# Patient Record
Sex: FEMALE | URBAN - METROPOLITAN AREA
[De-identification: names, ages, dates, MRNs, and addresses within clinical notes are randomized per-mention and may not be internally consistent; named-entity substitution may affect disease eponyms.]

---

## 2021-01-29 ENCOUNTER — IMPORTED ENCOUNTER (OUTPATIENT)
Dept: URBAN - METROPOLITAN AREA CLINIC 1 | Facility: CLINIC | Age: 71
End: 2021-01-29

## 2021-01-29 PROBLEM — H35.3131: Noted: 2021-01-29

## 2021-01-29 PROBLEM — Z79.84: Noted: 2021-01-29

## 2021-01-29 PROBLEM — H25.813: Noted: 2021-01-29

## 2021-01-29 PROBLEM — E11.9: Noted: 2021-01-29

## 2021-01-29 PROCEDURE — 92004 COMPRE OPH EXAM NEW PT 1/>: CPT

## 2021-01-29 PROCEDURE — 92134 CPTRZ OPH DX IMG PST SGM RTA: CPT

## 2021-01-29 NOTE — PATIENT DISCUSSION
1.  DM Type II (Oral Meds) without sign of diabetic retinopathy and no blot heme on dilated retinal examination today OU No Macular Edema:  Discussed the pathophysiology of diabetes and its effect on the eye and risk of blindness. Stressed the importance of strong glucose control. Advised of importance of at least yearly dilated examinations but to contact us immediately for any problems or concerns. 2. ARMD OU early/dry. 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 Importance of daily AREDS II study multivitamin and Amsler Grid (handout given) checks discussed with patient. Patient to follow-up immediately with any new onset of decreased vision and/or metamorphopsia. 3. Cataract OU: Observe for now without intervention. The patient was advised to contact us if any change or worsening of vision4. Papilloma UL OU - Observe. 5.  Conjuctival Chalasis OU - Observe. Patient deferred Manifest Rx today. Return for an appointment in 1 year 27 with Dr. Jessica Rudolph.

## 2022-04-02 ASSESSMENT — TONOMETRY
OD_IOP_MMHG: 14
OS_IOP_MMHG: 14

## 2022-04-02 ASSESSMENT — VISUAL ACUITY
OS_CC: 20/30
OD_CC: 20/25